# Patient Record
Sex: FEMALE | Race: WHITE | NOT HISPANIC OR LATINO
[De-identification: names, ages, dates, MRNs, and addresses within clinical notes are randomized per-mention and may not be internally consistent; named-entity substitution may affect disease eponyms.]

---

## 2022-09-21 PROBLEM — Z00.00 ENCOUNTER FOR PREVENTIVE HEALTH EXAMINATION: Status: ACTIVE | Noted: 2022-09-21

## 2022-09-26 ENCOUNTER — APPOINTMENT (OUTPATIENT)
Dept: ORTHOPEDIC SURGERY | Facility: CLINIC | Age: 52
End: 2022-09-26

## 2022-09-26 DIAGNOSIS — M22.41 CHONDROMALACIA PATELLAE, RIGHT KNEE: ICD-10-CM

## 2022-09-26 DIAGNOSIS — Z78.9 OTHER SPECIFIED HEALTH STATUS: ICD-10-CM

## 2022-09-26 PROCEDURE — 73562 X-RAY EXAM OF KNEE 3: CPT | Mod: RT

## 2022-09-26 PROCEDURE — 99204 OFFICE O/P NEW MOD 45 MIN: CPT

## 2022-09-26 NOTE — PHYSICAL EXAM
[4___] : hamstring 4[unfilled]/5 [] : patient ambulates without assistive device [Right] : right knee [Lateral] : lateral [Ong] : skyline [AP Standing] : anteroposterior standing [There are no fractures, subluxations or dislocations. No significant abnormalities are seen] : There are no fractures, subluxations or dislocations. No significant abnormalities are seen [[id number]] : flexion 120 degrees [de-identified] : extension 0 degrees

## 2022-09-26 NOTE — HISTORY OF PRESENT ILLNESS
[de-identified] : Patient exercises frequently and states that the posterior knee became painful and stiff recently. She has not exercised in about 10 days and the pain has improved slightly. She takes Advil, which does not help.

## 2022-10-24 ENCOUNTER — APPOINTMENT (OUTPATIENT)
Dept: ORTHOPEDIC SURGERY | Facility: CLINIC | Age: 52
End: 2022-10-24

## 2023-02-14 ENCOUNTER — OFFICE (OUTPATIENT)
Dept: URBAN - METROPOLITAN AREA CLINIC 38 | Facility: CLINIC | Age: 53
Setting detail: OPHTHALMOLOGY
End: 2023-02-14
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
## 2023-02-14 PROCEDURE — NO SHOW FE NO SHOW FEE: Performed by: OPHTHALMOLOGY

## 2023-02-17 ENCOUNTER — OFFICE (OUTPATIENT)
Dept: URBAN - METROPOLITAN AREA CLINIC 107 | Facility: CLINIC | Age: 53
Setting detail: OPHTHALMOLOGY
End: 2023-02-17
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
## 2023-02-17 PROCEDURE — NO SHOW FE NO SHOW FEE: Performed by: OPHTHALMOLOGY

## 2024-05-15 ENCOUNTER — OFFICE (OUTPATIENT)
Dept: URBAN - METROPOLITAN AREA CLINIC 97 | Facility: CLINIC | Age: 54
Setting detail: OPHTHALMOLOGY
End: 2024-05-15
Payer: COMMERCIAL

## 2024-05-15 VITALS — HEIGHT: 55 IN

## 2024-05-15 DIAGNOSIS — H25.13: ICD-10-CM

## 2024-05-15 DIAGNOSIS — H00.11: ICD-10-CM

## 2024-05-15 DIAGNOSIS — H16.223: ICD-10-CM

## 2024-05-15 DIAGNOSIS — H01.004: ICD-10-CM

## 2024-05-15 DIAGNOSIS — H52.4: ICD-10-CM

## 2024-05-15 DIAGNOSIS — H01.001: ICD-10-CM

## 2024-05-15 PROCEDURE — 92014 COMPRE OPH EXAM EST PT 1/>: CPT | Performed by: OPTOMETRIST

## 2024-05-15 PROCEDURE — 92015 DETERMINE REFRACTIVE STATE: CPT | Performed by: OPTOMETRIST

## 2024-05-15 ASSESSMENT — LID EXAM ASSESSMENTS
OS_BLEPHARITIS: LUL 2+
OD_BLEPHARITIS: RUL 2+

## 2024-05-15 ASSESSMENT — CONFRONTATIONAL VISUAL FIELD TEST (CVF)
OS_FINDINGS: FULL
OD_FINDINGS: FULL

## 2024-11-06 ENCOUNTER — OFFICE (OUTPATIENT)
Dept: URBAN - METROPOLITAN AREA CLINIC 38 | Facility: CLINIC | Age: 54
Setting detail: OPHTHALMOLOGY
End: 2024-11-06
Payer: COMMERCIAL

## 2024-11-06 DIAGNOSIS — H53.40: ICD-10-CM

## 2024-11-06 DIAGNOSIS — H02.834: ICD-10-CM

## 2024-11-06 DIAGNOSIS — H02.831: ICD-10-CM

## 2024-11-06 PROCEDURE — SCCOS COSMETIC CONSULTATION: Performed by: STUDENT IN AN ORGANIZED HEALTH CARE EDUCATION/TRAINING PROGRAM

## 2024-11-06 PROCEDURE — 99214 OFFICE O/P EST MOD 30 MIN: CPT | Performed by: STUDENT IN AN ORGANIZED HEALTH CARE EDUCATION/TRAINING PROGRAM

## 2024-11-06 PROCEDURE — 92082 INTERMEDIATE VISUAL FIELD XM: CPT | Performed by: STUDENT IN AN ORGANIZED HEALTH CARE EDUCATION/TRAINING PROGRAM

## 2024-11-06 PROCEDURE — 92285 EXTERNAL OCULAR PHOTOGRAPHY: CPT | Performed by: STUDENT IN AN ORGANIZED HEALTH CARE EDUCATION/TRAINING PROGRAM

## 2024-11-06 ASSESSMENT — REFRACTION_AUTOREFRACTION
OD_AXIS: 169
OS_CYLINDER: +1.50
OS_AXIS: 015
OS_AXIS: 014
OS_SPHERE: -0.50
OS_SPHERE: -0.50
OD_AXIS: 171
OD_SPHERE: PLANO
OD_CYLINDER: +1.00
OD_SPHERE: PLANO
OD_CYLINDER: +1.25
OS_CYLINDER: +1.50

## 2024-11-06 ASSESSMENT — KERATOMETRY
OD_K1POWER_DIOPTERS: 46.00
OD_K2POWER_DIOPTERS: 47.00
OD_AXISANGLE_DEGREES: 136
OS_K1POWER_DIOPTERS: 43.25
OS_AXISANGLE_DEGREES: 004
OS_K2POWER_DIOPTERS: 44.00
METHOD_AUTO_MANUAL: AUTO

## 2024-11-06 ASSESSMENT — SUPERFICIAL PUNCTATE KERATITIS (SPK)
OS_SPK: 1+
OD_SPK: 1+

## 2024-11-06 ASSESSMENT — VISUAL ACUITY
OD_BCVA: 20/25-
OS_BCVA: 20/25

## 2024-11-06 ASSESSMENT — REFRACTION_CURRENTRX
OD_OVR_VA: 20/
OD_VPRISM_DIRECTION: SV
OD_SPHERE: +1.25
OS_SPHERE: +1.25
OS_OVR_VA: 20/
OS_VPRISM_DIRECTION: SV

## 2024-11-06 ASSESSMENT — CONFRONTATIONAL VISUAL FIELD TEST (CVF)
OS_FINDINGS: FULL
OD_FINDINGS: FULL

## 2024-12-09 ENCOUNTER — OFFICE (OUTPATIENT)
Dept: URBAN - METROPOLITAN AREA CLINIC 38 | Facility: CLINIC | Age: 54
Setting detail: OPHTHALMOLOGY
End: 2024-12-09
Payer: COMMERCIAL

## 2024-12-09 DIAGNOSIS — H04.123: ICD-10-CM

## 2024-12-09 DIAGNOSIS — H02.834: ICD-10-CM

## 2024-12-09 DIAGNOSIS — H02.831: ICD-10-CM

## 2024-12-09 PROCEDURE — 83861 MICROFLUID ANALY TEARS: CPT | Mod: QW | Performed by: STUDENT IN AN ORGANIZED HEALTH CARE EDUCATION/TRAINING PROGRAM

## 2024-12-09 PROCEDURE — 99213 OFFICE O/P EST LOW 20 MIN: CPT | Performed by: STUDENT IN AN ORGANIZED HEALTH CARE EDUCATION/TRAINING PROGRAM

## 2024-12-09 ASSESSMENT — SUPERFICIAL PUNCTATE KERATITIS (SPK)
OD_SPK: 1+
OS_SPK: 1+

## 2024-12-09 ASSESSMENT — CONFRONTATIONAL VISUAL FIELD TEST (CVF)
OD_FINDINGS: FULL
OS_FINDINGS: FULL

## 2024-12-10 ASSESSMENT — REFRACTION_AUTOREFRACTION
OS_AXIS: 015
OD_SPHERE: PLANO
OS_CYLINDER: +1.50
OS_AXIS: 014
OD_AXIS: 171
OS_SPHERE: -0.50
OS_CYLINDER: +1.50
OD_AXIS: 169
OD_CYLINDER: +1.00
OD_SPHERE: PLANO
OD_CYLINDER: +1.25
OS_SPHERE: -0.50

## 2024-12-10 ASSESSMENT — KERATOMETRY
OS_K1POWER_DIOPTERS: 43.25
OD_AXISANGLE_DEGREES: 136
OS_AXISANGLE_DEGREES: 004
OD_K2POWER_DIOPTERS: 47.00
METHOD_AUTO_MANUAL: AUTO
OD_K1POWER_DIOPTERS: 46.00
OS_K2POWER_DIOPTERS: 44.00

## 2024-12-10 ASSESSMENT — VISUAL ACUITY
OS_BCVA: 20/25
OD_BCVA: 20/25

## 2024-12-16 ENCOUNTER — AMBULATORY SURGERY CENTER (OUTPATIENT)
Dept: URBAN - METROPOLITAN AREA SURGERY 4 | Facility: SURGERY | Age: 54
Setting detail: OPHTHALMOLOGY
End: 2024-12-16
Payer: COMMERCIAL

## 2024-12-16 DIAGNOSIS — H02.831: ICD-10-CM

## 2024-12-16 DIAGNOSIS — H02.834: ICD-10-CM

## 2024-12-16 PROCEDURE — 15823 BLEPHARP UPR EYELID XCSV SKN: CPT | Mod: E1,E3 | Performed by: STUDENT IN AN ORGANIZED HEALTH CARE EDUCATION/TRAINING PROGRAM

## 2024-12-23 ENCOUNTER — OFFICE (OUTPATIENT)
Dept: URBAN - METROPOLITAN AREA CLINIC 38 | Facility: CLINIC | Age: 54
Setting detail: OPHTHALMOLOGY
End: 2024-12-23
Payer: COMMERCIAL

## 2024-12-23 ENCOUNTER — RX ONLY (RX ONLY)
Age: 54
End: 2024-12-23

## 2024-12-23 VITALS — HEIGHT: 55 IN

## 2024-12-23 DIAGNOSIS — H02.834: ICD-10-CM

## 2024-12-23 DIAGNOSIS — H02.831: ICD-10-CM

## 2024-12-23 PROBLEM — H04.123 DRY EYE SYNDROME LACRIMAL GLAND; BOTH EYES: Status: ACTIVE | Noted: 2024-12-09

## 2024-12-23 PROCEDURE — 99024 POSTOP FOLLOW-UP VISIT: CPT | Performed by: STUDENT IN AN ORGANIZED HEALTH CARE EDUCATION/TRAINING PROGRAM

## 2024-12-23 ASSESSMENT — KERATOMETRY
OD_K1POWER_DIOPTERS: 46.00
OS_K1POWER_DIOPTERS: 43.25
OD_K2POWER_DIOPTERS: 47.00
OS_K2POWER_DIOPTERS: 44.00
OS_AXISANGLE_DEGREES: 004
OD_AXISANGLE_DEGREES: 136
METHOD_AUTO_MANUAL: AUTO

## 2024-12-23 ASSESSMENT — SUPERFICIAL PUNCTATE KERATITIS (SPK)
OD_SPK: 1+
OS_SPK: 1+

## 2024-12-23 ASSESSMENT — REFRACTION_AUTOREFRACTION
OS_CYLINDER: +1.50
OS_CYLINDER: +1.50
OD_AXIS: 171
OS_AXIS: 015
OD_CYLINDER: +1.00
OS_SPHERE: -0.50
OD_AXIS: 169
OS_AXIS: 014
OD_SPHERE: PLANO
OD_SPHERE: PLANO
OS_SPHERE: -0.50
OD_CYLINDER: +1.25

## 2024-12-23 ASSESSMENT — CONFRONTATIONAL VISUAL FIELD TEST (CVF)
OS_FINDINGS: FULL
OD_FINDINGS: FULL

## 2024-12-23 ASSESSMENT — VISUAL ACUITY
OD_BCVA: 20/25
OS_BCVA: 20/25

## 2025-05-19 ENCOUNTER — OFFICE (OUTPATIENT)
Dept: URBAN - METROPOLITAN AREA CLINIC 97 | Facility: CLINIC | Age: 55
Setting detail: OPHTHALMOLOGY
End: 2025-05-19
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
## 2025-05-19 PROCEDURE — NO SHOW FE NO SHOW FEE: Performed by: OPTOMETRIST
